# Patient Record
(demographics unavailable — no encounter records)

---

## 2024-10-30 NOTE — HISTORY OF PRESENT ILLNESS
[FreeTextEntry1] : quality: abnormal cortisol  onset 2024 severity: moderate modifying factors: none  associated factors: abnormal LFts

## 2024-10-30 NOTE — ASSESSMENT
[FreeTextEntry1] :  High cortisol repeated x 2  will do 1 mg DST  probably stress level  Family h/o hypoT and hyperT; recent labs TFst normal.  check TPo ab  Overweight: discussed diet and exercise encouraged more exercise walking 30 min 3 x week Needs to try to have more protein for meals  All lab results reviewed independently and discussed with patient with extensive discussion.  All questions answered Laboratory tests ordered today Continue other current medications